# Patient Record
Sex: FEMALE | URBAN - METROPOLITAN AREA
[De-identification: names, ages, dates, MRNs, and addresses within clinical notes are randomized per-mention and may not be internally consistent; named-entity substitution may affect disease eponyms.]

---

## 2022-02-04 ENCOUNTER — TELEPHONE ENCOUNTER (OUTPATIENT)
Dept: URBAN - METROPOLITAN AREA CLINIC 72 | Facility: CLINIC | Age: 27
End: 2022-02-04

## 2022-03-24 ENCOUNTER — OFFICE VISIT (OUTPATIENT)
Dept: URBAN - METROPOLITAN AREA CLINIC 72 | Facility: CLINIC | Age: 27
End: 2022-03-24

## 2022-03-24 NOTE — HPI-TODAY'S VISIT:
Ms. Lowe is a pleasant 26-year-old female who presents as a new patient for nausea and vomiting after eating and drinking with weight loss.  She was referred by Dr. Manju Stephenson.

## 2022-03-30 ENCOUNTER — DASHBOARD ENCOUNTERS (OUTPATIENT)
Age: 27
End: 2022-03-30

## 2022-03-30 ENCOUNTER — OFFICE VISIT (OUTPATIENT)
Dept: URBAN - METROPOLITAN AREA CLINIC 72 | Facility: CLINIC | Age: 27
End: 2022-03-30
Payer: MEDICAID

## 2022-03-30 VITALS
BODY MASS INDEX: 15.07 KG/M2 | WEIGHT: 96 LBS | TEMPERATURE: 98 F | HEIGHT: 67 IN | DIASTOLIC BLOOD PRESSURE: 78 MMHG | RESPIRATION RATE: 18 BRPM | SYSTOLIC BLOOD PRESSURE: 107 MMHG | HEART RATE: 113 BPM

## 2022-03-30 DIAGNOSIS — K31.89 ACQUIRED DEFORMITY OF DUODENUM: ICD-10-CM

## 2022-03-30 DIAGNOSIS — F41.8 ANXIETY ABOUT HEALTH: ICD-10-CM

## 2022-03-30 DIAGNOSIS — K21.9 GERD WITHOUT ESOPHAGITIS: ICD-10-CM

## 2022-03-30 PROCEDURE — 99204 OFFICE O/P NEW MOD 45 MIN: CPT | Performed by: INTERNAL MEDICINE

## 2022-03-30 NOTE — HPI-TODAY'S VISIT:
Ms. Lowe is a pleasant 26-year-old female who presents as a new patient for nausea and vomiting after eating and drinking with weight loss.  She was referred by Dr. Manju Stephenson.  She reports that she has had longstanding issues with nausea, regurgitation and heartburn.  She had hyperemesis revealed and believes her issues stem from there.  She reports that even simply water can cause regurgitation.  She notes that she has had to decrease her caloric intake because she cannot tolerate large meals.  She does take omeprazole for reflux, unsure if it is providing any benefit.  Occasionally has to take ondansetron for the nausea and vomiting.  She denies any diarrhea or constipation.  She does have generalized abdominal dyspepsia.  She also notes that she was on medication which sounds like metoclopramide to help her digest, believes she has gastroparesis but no formal work-up.  No family history to her knowledge of any GI illnesses.  Endorses excessive anxiety depression exacerbated by stressful social situation.  Does take Celexa for anxiety and depression.  Does not feel that this is benefiting her.  Does see a therapist weekly.

## 2022-05-31 ENCOUNTER — OFFICE VISIT (OUTPATIENT)
Dept: URBAN - METROPOLITAN AREA CLINIC 72 | Facility: CLINIC | Age: 27
End: 2022-05-31

## 2022-07-08 ENCOUNTER — OFFICE VISIT (OUTPATIENT)
Dept: URBAN - METROPOLITAN AREA CLINIC 72 | Facility: CLINIC | Age: 27
End: 2022-07-08

## 2022-08-16 ENCOUNTER — OFFICE VISIT (OUTPATIENT)
Dept: URBAN - METROPOLITAN AREA CLINIC 72 | Facility: CLINIC | Age: 27
End: 2022-08-16

## 2022-08-16 PROBLEM — 266435005: Status: ACTIVE | Noted: 2022-03-30

## 2022-08-16 PROBLEM — 48694002: Status: ACTIVE | Noted: 2022-03-30

## 2022-08-16 NOTE — HPI-TODAY'S VISIT:
Mrs. Campbell returns for follow-up, she is a 27-year-old female last seen in office on 3/30/2022 for evaluation for nausea vomiting after eating and weight loss. She reports longstanding issues with nausea, regurgitation and heartburn and has had hyper emesis in the past.  Reports that she has regurgitation with water as well as foods.  Cannot tolerate large meals and had to decrease her caloric intake due to intolerance.  She also noted omeprazole for reflux but not essentially helping her the situation.  Indication take ondansetron for the nausea and vomiting did feel like it helped some but she had associated abdominal dyspepsia.  She reported excessive anxiety depression exacerbated by stressful situations despite the use of Celexa.  She does follow with a therapist. We long discussion regarding her symptoms and it appeared that her anxiety and depression were not well controlled and thus contributing to her GI symptoms.  Encouraged her to follow-up with her therapist and advised that she get records and discuss restarting or changing medications with her PCP.  We discussed small frequent meals and GERD lifestyle modifications.